# Patient Record
Sex: FEMALE | Race: WHITE | HISPANIC OR LATINO
[De-identification: names, ages, dates, MRNs, and addresses within clinical notes are randomized per-mention and may not be internally consistent; named-entity substitution may affect disease eponyms.]

---

## 2017-03-31 PROBLEM — Z00.00 ENCOUNTER FOR PREVENTIVE HEALTH EXAMINATION: Status: ACTIVE | Noted: 2017-03-31

## 2017-04-07 ENCOUNTER — APPOINTMENT (OUTPATIENT)
Dept: ORTHOPEDIC SURGERY | Facility: CLINIC | Age: 32
End: 2017-04-07

## 2017-04-07 VITALS — WEIGHT: 120 LBS | HEIGHT: 66 IN | BODY MASS INDEX: 19.29 KG/M2

## 2017-04-07 DIAGNOSIS — G56.21 LESION OF ULNAR NERVE, RIGHT UPPER LIMB: ICD-10-CM

## 2017-04-07 DIAGNOSIS — Z78.9 OTHER SPECIFIED HEALTH STATUS: ICD-10-CM

## 2017-04-07 DIAGNOSIS — G56.22 LESION OF ULNAR NERVE, LEFT UPPER LIMB: ICD-10-CM

## 2018-06-27 ENCOUNTER — APPOINTMENT (OUTPATIENT)
Dept: NEUROLOGY | Facility: CLINIC | Age: 33
End: 2018-06-27
Payer: COMMERCIAL

## 2018-06-27 VITALS
BODY MASS INDEX: 19.29 KG/M2 | SYSTOLIC BLOOD PRESSURE: 112 MMHG | HEART RATE: 51 BPM | TEMPERATURE: 98.3 F | WEIGHT: 120 LBS | DIASTOLIC BLOOD PRESSURE: 73 MMHG | HEIGHT: 66 IN | OXYGEN SATURATION: 100 %

## 2018-06-27 DIAGNOSIS — S06.0X9A CONCUSSION WITH LOSS OF CONSCIOUSNESS OF UNSPECIFIED DURATION, INITIAL ENCOUNTER: ICD-10-CM

## 2018-06-27 PROCEDURE — 99204 OFFICE O/P NEW MOD 45 MIN: CPT

## 2018-06-27 RX ORDER — AMITRIPTYLINE HYDROCHLORIDE 75 MG/1
TABLET, FILM COATED ORAL
Refills: 0 | Status: DISCONTINUED | COMMUNITY
End: 2018-06-27

## 2019-09-08 ENCOUNTER — TRANSCRIPTION ENCOUNTER (OUTPATIENT)
Age: 34
End: 2019-09-08

## 2020-05-28 ENCOUNTER — TRANSCRIPTION ENCOUNTER (OUTPATIENT)
Age: 35
End: 2020-05-28

## 2020-06-29 ENCOUNTER — EMERGENCY (EMERGENCY)
Facility: HOSPITAL | Age: 35
LOS: 1 days | Discharge: HOME | End: 2020-06-29
Admitting: EMERGENCY MEDICINE
Payer: COMMERCIAL

## 2020-06-29 VITALS
HEIGHT: 66 IN | WEIGHT: 119.05 LBS | DIASTOLIC BLOOD PRESSURE: 71 MMHG | SYSTOLIC BLOOD PRESSURE: 124 MMHG | HEART RATE: 74 BPM | RESPIRATION RATE: 16 BRPM | TEMPERATURE: 98 F | OXYGEN SATURATION: 98 %

## 2020-06-29 PROCEDURE — 99284 EMERGENCY DEPT VISIT MOD MDM: CPT | Mod: 25

## 2020-06-29 PROCEDURE — 73080 X-RAY EXAM OF ELBOW: CPT | Mod: 26,RT

## 2020-06-29 PROCEDURE — 70490 CT SOFT TISSUE NECK W/O DYE: CPT | Mod: 26

## 2020-06-29 RX ORDER — IBUPROFEN 200 MG
600 TABLET ORAL ONCE
Refills: 0 | Status: COMPLETED | OUTPATIENT
Start: 2020-06-29 | End: 2020-06-29

## 2020-06-29 RX ADMIN — Medication 600 MILLIGRAM(S): at 08:56

## 2020-06-29 NOTE — ED ADULT NURSE NOTE - OBJECTIVE STATEMENT
right elbow pain s/p fall down 1 stair striking arm on metal railing this am and throat irritation after swallowing a small piece of an oyster shell several days ago

## 2020-06-29 NOTE — ED PROVIDER NOTE - NSFOLLOWUPINSTRUCTIONS_ED_ALL_ED_FT
Take Ibuprofen 600mg every 6-8 hours as needed for pain, take with food, and in addition you may take Tylenol 500 mg every 6-8 hours as needed for pain  Rest. Cool compresses.  Extremity elevation.  Sling.    Follow up with Orthopedics within 1 week    Return to the Emergency Department for any concerning or worsening symptoms.

## 2020-06-29 NOTE — ED PROVIDER NOTE - DIAGNOSTIC INTERPRETATION
Interpreted by ED MELANY Pan  right elbow xrays 3 views  No fracture, no dislocation, no Foreign Body noted, soft tissue swelling, +anterior fat pad

## 2020-06-29 NOTE — ED PROVIDER NOTE - CARE PROVIDER_API CALL
Yosvnay Paz)  Orthopaedic Surgery  [address], [address] [place] [address]  Phone: [phone]  Fax: [phone]  Follow Up Time:

## 2020-06-29 NOTE — ED PROVIDER NOTE - OBJECTIVE STATEMENT
33 yo female with no sig pmhx presents c/o two complaints --   c/o right elbow pain with swelling  s/p trip and fall 9 hours ago. denies head trauma or LOC. no other injuries.   also c/o throat discomfort after eating oysters 3 days ago, she thinks she has a piece of oyster shell stuck in the right side of her throat. has been eating and drinking since, denies dyspnea. not drooling. no chest pain. 35 yo female with no sig pmhx presents c/o two complaints --   c/o right elbow pain with swelling  s/p trip and fall 9 hours ago. denies head trauma or LOC. no other injuries. denies neck pain, headache, chest or back pain. no lower extremity pain. ambulated to ED.   also c/o throat discomfort after eating oysters 3 days ago, she thinks she has a piece of oyster shell stuck in the right side of her throat. has been eating and drinking since, denies dyspnea. not drooling. no chest pain.

## 2020-06-29 NOTE — ED PROVIDER NOTE - PHYSICAL EXAMINATION
CONSTITUTIONAL: Well-appearing; well-nourished; in no apparent distress.   	HEAD: Normocephalic; atraumatic.   	EYES:  conjunctiva and sclera clear  	ENT: normal pharynx with no erythema or lesions. no visible foreign body. no drooling. tolerating secretions. tolerating po.   	NECK: Supple; non-tender;   	CARDIOVASCULAR: Normal S1, S2; no murmurs, rubs, or gallops. Regular rate and rhythm.   	RESPIRATORY: Breathing easily; breath sounds clear and equal bilaterally; no wheezes, rhonchi, or rales.  	GI: Soft; non-distended; non-tender  	EXT: RUE: +moderate swelling to elbow with ttp radial head, limited ROm due to pain, +pain with pronation and supination, no sensory or motor deficits, no wrist/hand or humerus tenderness. soft compartments. distal pulses intact. skin intact.   	SKIN: Normal for age and race; warm; dry; good turgor; no apparent lesions or rash.   	NEURO: A & O x 3; face symmetric; grossly unremarkable.   PSYCHOLOGICAL: The patient’s mood and manner are appropriate.

## 2020-06-29 NOTE — ED ADULT TRIAGE NOTE - CHIEF COMPLAINT QUOTE
Patient presents to the ED complaining of throat discomfort after swallowing a piece of shell from an oyster about 3 days ago. Since then been feeling like there something stuck on the throat.   Patient also complaining of R elbow pain after slipped and fell on an emergency stair and hit elbow on metal steps several hours ago.

## 2020-06-29 NOTE — ED PROVIDER NOTE - PATIENT PORTAL LINK FT
You can access the FollowMyHealth Patient Portal offered by [facility] by registering at the following website: [email or web address]. By joining [facility]’s FollowMyHealth portal, you will also be able to view your health information using other applications (apps) compatible with our system.

## 2020-06-29 NOTE — ED PROVIDER NOTE - CLINICAL SUMMARY MEDICAL DECISION MAKING FREE TEXT BOX
patient here c/o 2 complaints -- right elbow injury, xrays prelim neg for fx, +anterior fat pad, nvi, soft compartments, sling, nsaids prn, f/u ortho  c/o foreign body sensation to throat s/p ingesting oysters/possible oyster shell 3 days ago. no visible fb on physical exam, airway patent, no drooling, tolerating po, well appearing. CT soft tissue neck no fb. patient has appointment to see ENT for followup today, return precautions discussed, will samanta.

## 2020-07-03 DIAGNOSIS — M25.521 PAIN IN RIGHT ELBOW: ICD-10-CM

## 2020-07-03 DIAGNOSIS — J02.9 ACUTE PHARYNGITIS, UNSPECIFIED: ICD-10-CM

## 2020-07-04 ENCOUNTER — EMERGENCY (EMERGENCY)
Facility: HOSPITAL | Age: 35
LOS: 1 days | End: 2020-07-04
Admitting: EMERGENCY MEDICINE
Payer: COMMERCIAL

## 2020-07-04 PROCEDURE — 99283 EMERGENCY DEPT VISIT LOW MDM: CPT

## 2020-07-06 ENCOUNTER — APPOINTMENT (OUTPATIENT)
Dept: OTOLARYNGOLOGY | Facility: CLINIC | Age: 35
End: 2020-07-06
Payer: COMMERCIAL

## 2020-07-06 DIAGNOSIS — R13.10 DYSPHAGIA, UNSPECIFIED: ICD-10-CM

## 2020-07-06 DIAGNOSIS — Z86.59 PERSONAL HISTORY OF OTHER MENTAL AND BEHAVIORAL DISORDERS: ICD-10-CM

## 2020-07-06 DIAGNOSIS — R07.0 PAIN IN THROAT: ICD-10-CM

## 2020-07-06 DIAGNOSIS — Z78.9 OTHER SPECIFIED HEALTH STATUS: ICD-10-CM

## 2020-07-06 PROCEDURE — 99202 OFFICE O/P NEW SF 15 MIN: CPT | Mod: 25

## 2020-07-06 PROCEDURE — 31575 DIAGNOSTIC LARYNGOSCOPY: CPT

## 2020-07-09 PROBLEM — R13.10 DYSPHAGIA: Status: ACTIVE | Noted: 2020-07-09

## 2020-07-09 PROBLEM — R07.0 THROAT PAIN IN ADULT: Status: ACTIVE | Noted: 2020-07-09

## 2020-07-09 NOTE — ASSESSMENT
[FreeTextEntry1] : Clinically stable. I reassured her that there is no foreign body. I recommended she continue with supportive care. She will follow up with me on an as needed basis.

## 2020-07-09 NOTE — HISTORY OF PRESENT ILLNESS
[de-identified] : She reports that approximately 8 days ago she was eating and oyster. When she swallowed she felt some pain and discomfort in her right upper neck. When she was not improving she was evaluated in the emergency room. She had a CAT scan of her neck. They reported that they did not see any foreign body.\par \par I reviewed the CAT scan with the patient today and I did not see a foreign body.\par \par She was then seen by an otolaryngologist who evaluated her and did not see a foreign body.\par \par She then had T. and endoscopy the same day which was approximately one week ago which did not demonstrate a foreign body but did demonstrate esophagitis.\par She was given sulcrafate\par She continues to complain of pain in the right neck. It is not progressive. She is tolerating a diet. There is no blood pleural cavity.

## 2020-09-23 ENCOUNTER — RESULT REVIEW (OUTPATIENT)
Age: 35
End: 2020-09-23